# Patient Record
Sex: MALE
[De-identification: names, ages, dates, MRNs, and addresses within clinical notes are randomized per-mention and may not be internally consistent; named-entity substitution may affect disease eponyms.]

---

## 2021-06-21 PROBLEM — Z00.00 ENCOUNTER FOR PREVENTIVE HEALTH EXAMINATION: Status: ACTIVE | Noted: 2021-06-21

## 2021-06-22 ENCOUNTER — APPOINTMENT (OUTPATIENT)
Dept: ORTHOPEDIC SURGERY | Facility: CLINIC | Age: 66
End: 2021-06-22
Payer: COMMERCIAL

## 2021-06-22 VITALS — WEIGHT: 145 LBS | BODY MASS INDEX: 20.99 KG/M2 | HEIGHT: 69.5 IN

## 2021-06-22 DIAGNOSIS — Z78.9 OTHER SPECIFIED HEALTH STATUS: ICD-10-CM

## 2021-06-22 DIAGNOSIS — Z85.46 PERSONAL HISTORY OF MALIGNANT NEOPLASM OF PROSTATE: ICD-10-CM

## 2021-06-22 DIAGNOSIS — Z80.9 FAMILY HISTORY OF MALIGNANT NEOPLASM, UNSPECIFIED: ICD-10-CM

## 2021-06-22 DIAGNOSIS — M54.12 RADICULOPATHY, CERVICAL REGION: ICD-10-CM

## 2021-06-22 PROCEDURE — 73030 X-RAY EXAM OF SHOULDER: CPT | Mod: RT

## 2021-06-22 PROCEDURE — 99072 ADDL SUPL MATRL&STAF TM PHE: CPT

## 2021-06-22 PROCEDURE — 99204 OFFICE O/P NEW MOD 45 MIN: CPT | Mod: 25

## 2021-06-22 PROCEDURE — 72050 X-RAY EXAM NECK SPINE 4/5VWS: CPT

## 2021-06-22 PROCEDURE — 20610 DRAIN/INJ JOINT/BURSA W/O US: CPT | Mod: RT

## 2021-06-22 RX ORDER — DICLOFENAC SODIUM 75 MG/1
75 TABLET, DELAYED RELEASE ORAL
Qty: 60 | Refills: 1 | Status: ACTIVE | COMMUNITY
Start: 2021-06-22 | End: 1900-01-01

## 2021-06-22 RX ORDER — LEUPROLIDE ACETATE 22.5 MG/.375ML
22.5 INJECTION, SUSPENSION, EXTENDED RELEASE SUBCUTANEOUS
Qty: 1 | Refills: 0 | Status: ACTIVE | COMMUNITY
Start: 2021-06-08

## 2021-06-22 RX ORDER — TADALAFIL 5 MG/1
5 TABLET ORAL
Qty: 30 | Refills: 0 | Status: ACTIVE | COMMUNITY
Start: 2021-01-12

## 2021-06-22 RX ORDER — LEVOFLOXACIN 500 MG/1
500 TABLET, FILM COATED ORAL
Qty: 1 | Refills: 0 | Status: ACTIVE | COMMUNITY
Start: 2021-03-25

## 2021-06-22 RX ORDER — MINERAL OIL 100 G/100ML
ENEMA RECTAL
Qty: 133 | Refills: 0 | Status: ACTIVE | COMMUNITY
Start: 2021-03-25

## 2021-06-22 RX ORDER — LEUPROLIDE ACETATE 11.25 MG
KIT INTRAMUSCULAR
Refills: 0 | Status: ACTIVE | COMMUNITY

## 2021-06-22 NOTE — PHYSICAL EXAM
[de-identified] : General: No acute distress, conversant, well-nourished.\par Head: Normocephalic, atraumatic\par Neck: trachea midline, FROM\par Heart: normotensive and normal rate and rhythm\par Lungs: No labored breathing\par Skin: No abrasions, no rashes, no edema\par Psych: Alert and oriented to person, place and time\par Extremities: no peripheral edema or digital cyanosis\par Gait: Normal gait. Can perform tandem gait.  \par Vascular: warm and well perfused distally, palpable distal pulses\par \par MSK:\par Right Shoulder Exam:\par No swelling, no erythema.  \par No tenderness to palpation. \par + pain with active ROM\par \par Positive Neer's impingement sign\par Positive Hawkin's test\par Positive Prabhakar's test\par Pain with shoulder ER\par Good strength with shoulder ER and IR.\par \par No tenderness at bicipital groove\par Negative Speed's test\par Negative Yergson's test\par \par Negative Cross-body Adduction\par Negative Chester's test\par \par Sensation intact to light touch axillary, medial and lateral antebrachial cutaneous, median, radial and ulnar distributions\par +motor deltoid, biceps, triceps, EPL, FDP and IO\par palpable radial pulse, WWP distally\par \par Cervical Spine: \par No tenderness to palpation.  No step-off, no deformity.\par \par NEURO:\par Sensation \par          Left           \par C5     2/2               \par C6     2/2               \par C7     2/2               \par C8     2/2              \par T1     2/2             \par \par          Right         \par C5     2/2               \par C6     2/2               \par C7     2/2               \par C8     2/2              \par T1     2/2      \par \par Motor: \par                                                Left             \par C5 (deltoid abduction)             5/5               \par C6 (biceps flexion)                   5/5                \par C7 (triceps extension)             5/5               \par C8 (finger flexion)                     5/5               \par T1 (interosseous)                     5/5           \par \par                                                Right           \par C5 (deltoid abduction)             5/5               \par C6 (biceps flexion)                   5/5                \par C7 (triceps extension)             5/5               \par C8 (finger flexion)                     5/5               \par T1 (interosseous)                     5/5                     \par \par Sensation \par Left L2  -  2/2            \par Left L3  -  2/2\par Left L4  -  2/2\par Left L5  -  2/2\par Left S1  -  2/2\par \par Right L2  -  2/2            \par Right L3  -  2/2\par Right L4  -  2/2\par Right L5  -  2/2\par Right S1  -  2/2\par \par Motor: \par Left L2 (hip flexion)                            5/5                \par Left L3 (knee extension)                   5/5                \par Left L4 (ankle dorsiflexion)                 5/5                \par Left L5 (long toe extensor)                5/5                \par Left S1 (ankle plantar flexion)           5/5\par \par Right L2 (hip flexion)                            5/5                \par Right L3 (knee extension)                   5/5                \par Right L4 (ankle dorsiflexion)                 5/5                \par Right L5 (long toe extensor)                5/5                \par Right S1 (ankle plantar flexion)           5/5\par \par Reflexes: Normal and symmetric\par Negative Spurling’s test.  Negative Randall’s reflex.  \par Negative clonus.  Down-going Babinski. [de-identified] : I ordered cervical and right shoulder radiographs to evaluate the patient's pain.\par \par Cervical 4 view radiographs obtained in the office today shows no fracture or dislocation. Cervical spondylosis. No instability on dynamic series.\par \par Right shoulder 2 view radiographs obtained in the office today shows no fracture or dislocation.  AC joint arthritis.  \par \par MRI R shoulder (5/26/21): 1.  Intermediate to high-grade partial-thickness articular surface tear of the mid to posterior supraspinatus tendon at the insertion. Mild interstitial tearing of the anterior infraspinatus. Moderate background supraspinatus and infraspinatus tendinosis.\par 2.  High-grade partial-thickness tear of the deep articular fibers of the distal superior subscapularis tendon. Intra-articular long head biceps tendinosis with medial subluxation and moderate biceps tenosynovitis.\par 3.  Mild subacromial/subdeltoid bursitis.\par 4.  Moderate osteoarthritis of the AC joint.

## 2021-06-22 NOTE — HISTORY OF PRESENT ILLNESS
[de-identified] : 66 year old RHD male presents with right shoulder pain.  He has worked as a  for 42 years. He cannot recall a trauma or inciting event.  He says the pain bothers him at night.  It is worse when trying to do overhead work or with certain movements of his shoulder.  At times he feels neck pain and pain radiating to his forearm.  He denies fine motor deficits, recent illness, fevers, numbness, weakness, balance problems, saddle anesthesia, urinary retention or fecal incontinence.   He is not taking any medication regularly for the pain.\par

## 2021-06-22 NOTE — PROCEDURE
[de-identified] : Procedure: Right Shoulder Subacromial Joint injection with corticosteroid\par Pre-Procedure Diagnosis: Right rotator cuff tendinitis.  \par Post-Procedure Diagnosis: same\par \par The patient was educated about the risks and benefits of a corticosteroid injection.  Alternatives were discussed.  The patient understood and consented for the procedure.\par \par The area was sterilely prepped using isopropyl alcohol.  An ethyl chloride spray provided  local anesthesia.  Using the usual sterile technique, 1 ml of 40mg/1ml of Kenalog and 2 ml of Lidocaine 1% without epinephrine was injected into the joint.  A dressing was applied to the area.  The patient tolerated the procedure well and without complication.\par

## 2021-06-22 NOTE — ASSESSMENT
[FreeTextEntry1] : 66 year old male presents with right shoulder pain worse with overhead activities.  His exam is consistent with rotator cuff tendinitis.  We reviewed his radiographs and shoulder MRI.  He was given a right shoulder subacromial steroid injection which he tolerated well.  He was given a prescription for PT.  He was given a handout for exercises.  He was given a prescription for Meloxicam.  At times he has pain radiating into his forearm which suggests a component of cervical radiculopathy.  He declines cervical MRI at this time. He is neurologically intact.  He will followup in 6 weeks.  We discussed red flag symptoms that would require emergent evaluation. He knows to call with any questions or concerns or if his symptoms acutely worsen.

## 2021-08-03 ENCOUNTER — APPOINTMENT (OUTPATIENT)
Dept: ORTHOPEDIC SURGERY | Facility: CLINIC | Age: 66
End: 2021-08-03
Payer: COMMERCIAL

## 2021-08-03 PROCEDURE — 20610 DRAIN/INJ JOINT/BURSA W/O US: CPT | Mod: RT

## 2021-08-03 PROCEDURE — 99214 OFFICE O/P EST MOD 30 MIN: CPT | Mod: 25

## 2021-08-03 RX ORDER — DICLOFENAC SODIUM 75 MG/1
75 TABLET, DELAYED RELEASE ORAL
Qty: 60 | Refills: 1 | Status: ACTIVE | COMMUNITY
Start: 2021-08-03 | End: 1900-01-01

## 2021-08-03 NOTE — PHYSICAL EXAM
[de-identified] : General: No acute distress, conversant, well-nourished.\par Head: Normocephalic, atraumatic\par Neck: trachea midline, FROM\par Heart: normotensive and normal rate and rhythm\par Lungs: No labored breathing\par Skin: No abrasions, no rashes, no edema\par Psych: Alert and oriented to person, place and time\par Extremities: no peripheral edema or digital cyanosis\par Gait: Normal gait. Can perform tandem gait.  \par Vascular: warm and well perfused distally, palpable distal pulses\par \par MSK:\par Right Shoulder Exam:\par No swelling, no erythema.  \par No tenderness to palpation. \par + pain with active ROM\par \par Positive Neer's impingement sign\par Positive Hawkin's test\par Positive Prabhakar's test\par Pain with shoulder ER\par Good strength with shoulder ER and IR.\par \par No tenderness at bicipital groove\par Negative Speed's test\par Negative Yergson's test\par \par Negative Cross-body Adduction\par Negative Carroll's test\par \par Sensation intact to light touch axillary, medial and lateral antebrachial cutaneous, median, radial and ulnar distributions\par +motor deltoid, biceps, triceps, EPL, FDP and IO\par palpable radial pulse, WWP distally\par \par Cervical Spine: \par No tenderness to palpation.  No step-off, no deformity.\par \par NEURO:\par Sensation \par          Left           \par C5     2/2               \par C6     2/2               \par C7     2/2               \par C8     2/2              \par T1     2/2             \par \par          Right         \par C5     2/2               \par C6     2/2               \par C7     2/2               \par C8     2/2              \par T1     2/2      \par \par Motor: \par                                                Left             \par C5 (deltoid abduction)             5/5               \par C6 (biceps flexion)                   5/5                \par C7 (triceps extension)             5/5               \par C8 (finger flexion)                     5/5               \par T1 (interosseous)                     5/5           \par \par                                                Right           \par C5 (deltoid abduction)             5/5               \par C6 (biceps flexion)                   5/5                \par C7 (triceps extension)             5/5               \par C8 (finger flexion)                     5/5               \par T1 (interosseous)                     5/5                     \par \par Sensation \par Left L2  -  2/2            \par Left L3  -  2/2\par Left L4  -  2/2\par Left L5  -  2/2\par Left S1  -  2/2\par \par Right L2  -  2/2            \par Right L3  -  2/2\par Right L4  -  2/2\par Right L5  -  2/2\par Right S1  -  2/2\par \par Motor: \par Left L2 (hip flexion)                            5/5                \par Left L3 (knee extension)                   5/5                \par Left L4 (ankle dorsiflexion)                 5/5                \par Left L5 (long toe extensor)                5/5                \par Left S1 (ankle plantar flexion)           5/5\par \par Right L2 (hip flexion)                            5/5                \par Right L3 (knee extension)                   5/5                \par Right L4 (ankle dorsiflexion)                 5/5                \par Right L5 (long toe extensor)                5/5                \par Right S1 (ankle plantar flexion)           5/5\par \par Reflexes: Normal and symmetric\par Negative Spurling’s test.  Negative Randall’s reflex.  \par Negative clonus.  Down-going Babinski. [de-identified] : Cervical 4 view radiographs (8/3/21) no fracture or dislocation. Cervical spondylosis. No instability on dynamic series.\par \par Right shoulder 2 view radiographs (8/3/21) no fracture or dislocation.  AC joint arthritis.  \par \par MRI R shoulder (5/26/21): 1.  Intermediate to high-grade partial-thickness articular surface tear of the mid to posterior supraspinatus tendon at the insertion. Mild interstitial tearing of the anterior infraspinatus. Moderate background supraspinatus and infraspinatus tendinosis.\par 2.  High-grade partial-thickness tear of the deep articular fibers of the distal superior subscapularis tendon. Intra-articular long head biceps tendinosis with medial subluxation and moderate biceps tenosynovitis.\par 3.  Mild subacromial/subdeltoid bursitis.\par 4.  Moderate osteoarthritis of the AC joint.

## 2021-08-03 NOTE — PROCEDURE
[de-identified] : Procedure: Right Shoulder Subacromial Joint injection with corticosteroid\par Pre-Procedure Diagnosis: Right rotator cuff tendinitis.  \par Post-Procedure Diagnosis: same\par \par The patient was educated about the risks and benefits of a corticosteroid injection.  Alternatives were discussed.  The patient understood and consented for the procedure.\par \par The area was sterilely prepped using isopropyl alcohol.  An ethyl chloride spray provided  local anesthesia.  Using the usual sterile technique, 1 ml of 40mg/1ml of Kenalog and 2 ml of Lidocaine 1% without epinephrine was injected into the joint.  A dressing was applied to the area.  The patient tolerated the procedure well and without complication.\par

## 2021-08-03 NOTE — ASSESSMENT
[FreeTextEntry1] : 66 year old male followup with acute exacerbation of right shoulder pain secondary to rotator cuff tendinitis.  We reviewed his radiographs and shoulder MRI. He had complete relief of his symptoms with a steroid injection and PT in the past.  However his symptoms reoccurred a few days ago when reaching to clean a high object.  He was given a new referral for PT. He was given a new right shoulder subacromial steroid injection which he tolerated well.  He can take Diclofenac as needed.  He will followup in 6 weeks.  We discussed red flag symptoms that would require emergent evaluation. He knows to call with any questions or concerns or if his symptoms acutely worsen.

## 2021-08-03 NOTE — HISTORY OF PRESENT ILLNESS
[de-identified] : 66 year old male returns for followup for acute exacerbation of chronic right shoulder pain.  After his last visit his pain resolved with a steroid injection and physical therapy.  However a few days ago he was cleaning his bathroom and he reached a high object and had the right shoulder pain.  It is not as bad as last time.  He is not taking any medications regularly for the pain.  He denies radicular pain, recent illness, fevers, numbness, weakness, balance problems, saddle anesthesia, urinary retention or fecal incontinence.

## 2021-09-14 ENCOUNTER — APPOINTMENT (OUTPATIENT)
Dept: ORTHOPEDIC SURGERY | Facility: CLINIC | Age: 66
End: 2021-09-14
Payer: COMMERCIAL

## 2021-09-14 DIAGNOSIS — M75.81 OTHER SHOULDER LESIONS, RIGHT SHOULDER: ICD-10-CM

## 2021-09-14 PROCEDURE — 99214 OFFICE O/P EST MOD 30 MIN: CPT

## 2021-09-15 PROBLEM — M75.81 TENDINITIS OF RIGHT ROTATOR CUFF: Status: ACTIVE | Noted: 2021-06-22

## 2021-09-15 NOTE — ASSESSMENT
[FreeTextEntry1] : 66 year old male followup with acute exacerbation of right shoulder pain secondary to rotator cuff tendinitis.  Since his last visit he has had no pain.  He has improvement in strength and motion.  He does have some weakness with overhead movement but it is not interfering with with his life.  He does not want surgery.  He is happy with his outcome. He will continue Diclofenac as needed.  He will continue PT.  He will followup as needed.  We discussed red flag symptoms that would require emergent evaluation. He knows to call with any questions or concerns or if his symptoms acutely worsen.

## 2021-09-15 NOTE — REASON FOR VISIT
[Follow-Up Visit] : a follow-up visit for [FreeTextEntry2] : fori acute exacerbation of chronic Right shoulder pain

## 2021-09-15 NOTE — HISTORY OF PRESENT ILLNESS
[de-identified] : 66 year old RHD male returns for followup for acute exacerbation of right shoulder pain.  Since his last visit he has had a resolution of his shoulder pain.  He reports a great response to his corticosteroid injection.  He has been doing PT and has seen improved strength and range of motion.  He does have some weakness when bring his arm above his head but this doesn't interfere with his life.  He is retiring soon.  He is happy with his outcome.

## 2021-09-15 NOTE — PHYSICAL EXAM
[de-identified] : General: No acute distress, conversant, well-nourished.\par Head: Normocephalic, atraumatic\par Neck: trachea midline, FROM\par Heart: normotensive and normal rate and rhythm\par Lungs: No labored breathing\par Skin: No abrasions, no rashes, no edema\par Psych: Alert and oriented to person, place and time\par Extremities: no peripheral edema or digital cyanosis\par Gait: Normal gait. Can perform tandem gait.  \par Vascular: warm and well perfused distally, palpable distal pulses\par \par MSK:\par Right Shoulder Exam:\par No swelling, no erythema.  \par No tenderness to palpation. \par No pain with active ROM\par \par Negative Neer's impingement sign\par Negative Hawkin's test\par Mild weakness with Prabhakar's test\par No pain and good strength with shoulder ER\par Good strength with shoulder ER and IR.\par \par No tenderness at bicipital groove\par Negative Speed's test\par Negative Yergson's test\par \par Negative Cross-body Adduction\par Negative Beaumont's test\par \par Sensation intact to light touch axillary, medial and lateral antebrachial cutaneous, median, radial and ulnar distributions\par +motor deltoid, biceps, triceps, EPL, FDP and IO\par palpable radial pulse, WWP distally\par \par Cervical Spine: \par No tenderness to palpation.  No step-off, no deformity.\par \par NEURO:\par Sensation \par          Left           \par C5     2/2               \par C6     2/2               \par C7     2/2               \par C8     2/2              \par T1     2/2             \par \par          Right         \par C5     2/2               \par C6     2/2               \par C7     2/2               \par C8     2/2              \par T1     2/2      \par \par Motor: \par                                                Left             \par C5 (deltoid abduction)             5/5               \par C6 (biceps flexion)                   5/5                \par C7 (triceps extension)             5/5               \par C8 (finger flexion)                     5/5               \par T1 (interosseous)                     5/5           \par \par                                                Right           \par C5 (deltoid abduction)             5/5               \par C6 (biceps flexion)                   5/5                \par C7 (triceps extension)             5/5               \par C8 (finger flexion)                     5/5               \par T1 (interosseous)                     5/5                     \par \par Sensation \par Left L2  -  2/2            \par Left L3  -  2/2\par Left L4  -  2/2\par Left L5  -  2/2\par Left S1  -  2/2\par \par Right L2  -  2/2            \par Right L3  -  2/2\par Right L4  -  2/2\par Right L5  -  2/2\par Right S1  -  2/2\par \par Motor: \par Left L2 (hip flexion)                            5/5                \par Left L3 (knee extension)                   5/5                \par Left L4 (ankle dorsiflexion)                 5/5                \par Left L5 (long toe extensor)                5/5                \par Left S1 (ankle plantar flexion)           5/5\par \par Right L2 (hip flexion)                            5/5                \par Right L3 (knee extension)                   5/5                \par Right L4 (ankle dorsiflexion)                 5/5                \par Right L5 (long toe extensor)                5/5                \par Right S1 (ankle plantar flexion)           5/5\par \par Reflexes: Normal and symmetric\par Negative Spurling’s test.  Negative Randall’s reflex.  \par Negative clonus.  Down-going Babinski. [de-identified] : Cervical 4 view radiographs (8/3/21) no fracture or dislocation. Cervical spondylosis. No instability on dynamic series.\par \par Right shoulder 2 view radiographs (8/3/21) no fracture or dislocation.  AC joint arthritis.  \par \par MRI R shoulder (5/26/21): 1.  Intermediate to high-grade partial-thickness articular surface tear of the mid to posterior supraspinatus tendon at the insertion. Mild interstitial tearing of the anterior infraspinatus. Moderate background supraspinatus and infraspinatus tendinosis.\par 2.  High-grade partial-thickness tear of the deep articular fibers of the distal superior subscapularis tendon. Intra-articular long head biceps tendinosis with medial subluxation and moderate biceps tenosynovitis.\par 3.  Mild subacromial/subdeltoid bursitis.\par 4.  Moderate osteoarthritis of the AC joint.